# Patient Record
Sex: FEMALE | URBAN - METROPOLITAN AREA
[De-identification: names, ages, dates, MRNs, and addresses within clinical notes are randomized per-mention and may not be internally consistent; named-entity substitution may affect disease eponyms.]

---

## 2020-06-05 ENCOUNTER — OFFICE VISIT (OUTPATIENT)
Dept: URBAN - METROPOLITAN AREA CLINIC 88 | Facility: CLINIC | Age: 65
End: 2020-06-05
Payer: MEDICARE

## 2020-06-05 DIAGNOSIS — H02.831 DERMATOCHALASIS OF RIGHT UPPER EYELID: ICD-10-CM

## 2020-06-05 DIAGNOSIS — H02.834 DERMATOCHALASIS OF LEFT UPPER EYELID: Chronic | ICD-10-CM

## 2020-06-05 DIAGNOSIS — H18.413 ARCUS SENILIS, BILATERAL: Chronic | ICD-10-CM

## 2020-06-05 DIAGNOSIS — H25.13 BILATERAL NUCLEAR SCLEROSIS CATARACT: Primary | Chronic | ICD-10-CM

## 2020-06-05 DIAGNOSIS — H52.223 REGULAR ASTIGMATISM, BILATERAL: ICD-10-CM

## 2020-06-05 PROCEDURE — 99204 OFFICE O/P NEW MOD 45 MIN: CPT | Performed by: OPTOMETRIST

## 2020-06-05 ASSESSMENT — VISUAL ACUITY
OD: 20/30
OS: 20/25

## 2020-06-05 ASSESSMENT — KERATOMETRY
OD: 43.63
OS: 44.25

## 2020-06-05 NOTE — IMPRESSION/PLAN
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 Plan: Patient educated to condition. No surgery is recommended at that time. Patient knows to RTC if symptoms of decreased side vision is experienced.

## 2020-06-05 NOTE — IMPRESSION/PLAN
Impression: Bilateral nuclear sclerosis cataract: H25.13. Plan: Reviewed status of cataract with patient and potential effect on visual potential.  Patient is not interested in surgery at this time and is comfortable monitoring for any decrease in vision before considering surgery options. All patient questions were answered and patient appears satisfied in their understanding of condition. Patient knows to return to clinic if changes in vision experienced.

## 2020-09-11 ENCOUNTER — TESTING ONLY (OUTPATIENT)
Dept: URBAN - METROPOLITAN AREA CLINIC 88 | Facility: CLINIC | Age: 65
End: 2020-09-11

## 2020-09-11 PROCEDURE — V2799 MISC VISION ITEM OR SERVICE: HCPCS | Performed by: OPTOMETRIST

## 2020-09-11 ASSESSMENT — VISUAL ACUITY
OD: 20/25
OS: 20/30

## 2020-09-11 NOTE — IMPRESSION/PLAN
Impression: Regular astigmatism, bilateral: H52.223. Plan: Reviewed refractive prescription in detail with patient and discussed in detail her sensitivity to visual changes. Reduced power of astigmatism correction and trial framed for pt. She was happy with the vision with new rx. Recommended switching from Progressive to bifocal design for increased chance of adaptation to glasses. Ok to release doctor redo spectacle prescription today.